# Patient Record
Sex: MALE | Race: WHITE | NOT HISPANIC OR LATINO | URBAN - NONMETROPOLITAN AREA
[De-identification: names, ages, dates, MRNs, and addresses within clinical notes are randomized per-mention and may not be internally consistent; named-entity substitution may affect disease eponyms.]

---

## 2019-02-05 ENCOUNTER — OFFICE VISIT (OUTPATIENT)
Dept: CARDIOLOGY | Facility: CLINIC | Age: 64
End: 2019-02-05

## 2019-02-05 VITALS
BODY MASS INDEX: 24.56 KG/M2 | HEART RATE: 94 BPM | HEIGHT: 74 IN | WEIGHT: 191.4 LBS | OXYGEN SATURATION: 95 % | DIASTOLIC BLOOD PRESSURE: 73 MMHG | SYSTOLIC BLOOD PRESSURE: 104 MMHG

## 2019-02-05 DIAGNOSIS — R07.2 PRECORDIAL PAIN: Primary | ICD-10-CM

## 2019-02-05 DIAGNOSIS — E78.2 MIXED HYPERLIPIDEMIA: ICD-10-CM

## 2019-02-05 DIAGNOSIS — R55 SYNCOPE AND COLLAPSE: ICD-10-CM

## 2019-02-05 DIAGNOSIS — I63.9 CEREBROVASCULAR ACCIDENT (CVA), UNSPECIFIED MECHANISM (HCC): ICD-10-CM

## 2019-02-05 DIAGNOSIS — R00.2 PALPITATION: ICD-10-CM

## 2019-02-05 DIAGNOSIS — R06.02 SHORTNESS OF BREATH: ICD-10-CM

## 2019-02-05 PROBLEM — E11.8 TYPE 2 DIABETES MELLITUS WITH COMPLICATION, WITH LONG-TERM CURRENT USE OF INSULIN (HCC): Status: ACTIVE | Noted: 2019-02-05

## 2019-02-05 PROBLEM — Z79.4 TYPE 2 DIABETES MELLITUS WITH COMPLICATION, WITH LONG-TERM CURRENT USE OF INSULIN (HCC): Status: ACTIVE | Noted: 2019-02-05

## 2019-02-05 PROBLEM — D50.9 IRON DEFICIENCY ANEMIA: Status: ACTIVE | Noted: 2019-02-05

## 2019-02-05 PROBLEM — N28.9 KIDNEY DISEASE: Status: ACTIVE | Noted: 2019-02-05

## 2019-02-05 PROCEDURE — 99204 OFFICE O/P NEW MOD 45 MIN: CPT | Performed by: INTERNAL MEDICINE

## 2019-02-05 PROCEDURE — 93000 ELECTROCARDIOGRAM COMPLETE: CPT | Performed by: INTERNAL MEDICINE

## 2019-02-05 RX ORDER — POLYETHYLENE GLYCOL 3350 17 G/17G
17 POWDER, FOR SOLUTION ORAL DAILY PRN
COMMUNITY

## 2019-02-05 RX ORDER — DULOXETIN HYDROCHLORIDE 60 MG/1
60 CAPSULE, DELAYED RELEASE ORAL DAILY
COMMUNITY

## 2019-02-05 RX ORDER — NITROGLYCERIN 0.4 MG/1
0.4 TABLET SUBLINGUAL
COMMUNITY

## 2019-02-05 RX ORDER — DIMENHYDRINATE 50 MG
TABLET ORAL NIGHTLY
COMMUNITY

## 2019-02-05 RX ORDER — BACLOFEN 10 MG/1
5 TABLET ORAL 2 TIMES DAILY
COMMUNITY

## 2019-02-05 RX ORDER — ONDANSETRON 4 MG/1
4 TABLET, FILM COATED ORAL DAILY PRN
COMMUNITY

## 2019-02-05 RX ORDER — ACETAMINOPHEN 325 MG/1
650 TABLET ORAL EVERY 6 HOURS PRN
COMMUNITY

## 2019-02-05 RX ORDER — GABAPENTIN 100 MG/1
100 CAPSULE ORAL 2 TIMES DAILY
COMMUNITY

## 2019-02-05 RX ORDER — TIZANIDINE HYDROCHLORIDE 2 MG/1
2 CAPSULE, GELATIN COATED ORAL 3 TIMES DAILY PRN
COMMUNITY

## 2019-02-05 RX ORDER — ATORVASTATIN CALCIUM 40 MG/1
40 TABLET, FILM COATED ORAL NIGHTLY
Qty: 30 TABLET | Refills: 11 | Status: SHIPPED | OUTPATIENT
Start: 2019-02-05

## 2019-02-05 RX ORDER — GLIPIZIDE 2.5 MG/1
2.5 TABLET, EXTENDED RELEASE ORAL DAILY
COMMUNITY

## 2019-02-05 RX ORDER — IRON POLYSACCHARIDE COMPLEX 150 MG
150 CAPSULE ORAL DAILY
COMMUNITY

## 2019-02-05 RX ORDER — SIMVASTATIN 20 MG
20 TABLET ORAL NIGHTLY
COMMUNITY
End: 2019-02-05 | Stop reason: ALTCHOICE

## 2019-02-05 RX ORDER — CETIRIZINE HYDROCHLORIDE 10 MG/1
10 TABLET ORAL DAILY
COMMUNITY

## 2019-02-05 RX ORDER — CLOPIDOGREL BISULFATE 75 MG/1
75 TABLET ORAL DAILY
Qty: 30 TABLET | Refills: 11 | Status: SHIPPED | OUTPATIENT
Start: 2019-02-05

## 2019-02-05 RX ORDER — ALBUTEROL SULFATE 90 UG/1
2 AEROSOL, METERED RESPIRATORY (INHALATION) EVERY 6 HOURS PRN
COMMUNITY

## 2019-02-05 RX ORDER — LEVOCETIRIZINE DIHYDROCHLORIDE 5 MG/1
5 TABLET, FILM COATED ORAL EVERY EVENING
COMMUNITY

## 2019-02-05 NOTE — PROGRESS NOTES
Subjective   Saturnino Dunaway is a 63 y.o. male     Chief Complaint   Patient presents with   • Establish Care   • Chest Pain   • Shortness of Breath   • Palpitations   • Hyperlipidemia       PROBLEM LIST:     1. Chest Pain  2. Shortness of Breath  3. Palpitations  4. CVA  5. DM, type 2, insulin dependent  6. Hyperlipidemia  7. Chronic kidney disease  8. Anemia  9. Orthostatic Hypotension            Specialty Problems        Cardiology Problems    Mixed hyperlipidemia        Palpitation                HPI:  Mr. Saturnino Dunaway is a 63-year-old white male seen today for evaluation of chest pain.    Mr. Dunaway describes the onset of chest pain approximately one month ago.  Rest noted discomfort when he was lying in bed.  He had a sudden lancinating short-lived left mid parasternal pain.  Lost him to have a sense of shortness of breath and he had to sit up in bed.  And was lasted only seconds and resolved completely.  However, since that time, the patient has had multiple similar episodes.  These frequently occur when lying in bed at night.  However, the patient also describes exertional chest discomfort.  He was walking up and down the [place] using his walker when he developed similar chest discomfort.  This was associated with profound shortness of breath but not palpitations, nausea, or diaphoresis.  Symptoms resolved when walking stopped.  The patient took a nap, awoke, but was still short of breath and having chest discomfort.  He lied back down and went to sleep, and when he awoke a second time he no longer had chest discomfort.    The patient describes episodes of orthopnea, PND, and lower extremity edema.  Each of these have been relatively stable over time.  He denies symptoms of prolonged palpitations and he had one episode of syncope approximately one year ago.  He was evaluated at that time with no obvious etiology.  Mr. Dunaway does describe headache lightheadedness.  The patient admits to having a stroke  approximately one year ago which caused imbalance and difficulty with the lower extremities per his report.  He does not claudicate.  He describes symptoms compatible with significant diabetic neuropathy.                  CURRENT MEDICATION:    Current Outpatient Medications   Medication Sig Dispense Refill   • acetaminophen (TYLENOL) 325 MG tablet Take 650 mg by mouth Every 6 (Six) Hours As Needed for Mild Pain .     • albuterol sulfate  (90 Base) MCG/ACT inhaler Inhale 2 puffs Every 6 (Six) Hours As Needed for Wheezing.     • aspirin 81 MG tablet Take 81 mg by mouth Daily.     • baclofen (LIORESAL) 10 MG tablet Take 5 mg by mouth 2 (Two) Times a Day.     • cetirizine (zyrTEC) 10 MG tablet Take 10 mg by mouth Daily.     • Cholecalciferol (VITAMIN D3 PO) Take 2,000 Units by mouth Daily.     • Cyanocobalamin (VITAMIN B 12 PO) Take 1,000 mcg by mouth Daily.     • diclofenac (VOLTAREN) 1 % gel gel Apply 4 g topically to the appropriate area as directed 4 (Four) Times a Day As Needed.     • DULoxetine (CYMBALTA) 60 MG capsule Take 60 mg by mouth Daily.     • Flaxseed, Linseed, (FLAX SEED OIL) 1000 MG capsule Take  by mouth Every Night.     • fluticasone (VERAMYST) 27.5 MCG/SPRAY nasal spray 1 spray into the nostril(s) as directed by provider 2 (Two) Times a Day As Needed for Rhinitis.     • gabapentin (NEURONTIN) 100 MG capsule Take 100 mg by mouth 2 (Two) Times a Day.     • glipiZIDE (GLUCOTROL XL) 2.5 MG 24 hr tablet Take 2.5 mg by mouth Daily.     • insulin detemir (LEVEMIR) 100 UNIT/ML injection Inject 47 Units under the skin into the appropriate area as directed Every Night.     • insulin NPH-insulin regular (NOVOLIN 70/30) (70-30) 100 UNIT/ML injection Inject 6 Units under the skin into the appropriate area as directed 2 (Two) Times a Day With Meals.     • iron polysaccharides (NIFEREX) 150 MG capsule Take 150 mg by mouth Daily.     • levocetirizine (XYZAL) 5 MG tablet Take 5 mg by mouth Every Evening.    "  • linaclotide (LINZESS) 145 MCG capsule capsule Take 145 mcg by mouth Every Night.     • MOMETASONE FUROATE NA 2 sprays into the nostril(s) as directed by provider Daily.     • Multiple Vitamins-Minerals (CERTAVITE/ANTIOXIDANTS PO) Take  by mouth Daily.     • ondansetron (ZOFRAN) 4 MG tablet Take 4 mg by mouth Daily As Needed for Nausea or Vomiting.     • polyethylene glycol (MIRALAX) packet Take 17 g by mouth Daily As Needed.     • simvastatin (ZOCOR) 20 MG tablet Take 20 mg by mouth Every Night.     • TiZANidine (ZANAFLEX) 2 MG capsule Take 2 mg by mouth 3 (Three) Times a Day As Needed for Muscle Spasms.     • nitroglycerin (NITROSTAT) 0.4 MG SL tablet Place 0.4 mg under the tongue Every 5 (Five) Minutes As Needed for Chest Pain. Take no more than 3 doses in 15 minutes.       No current facility-administered medications for this visit.        ALLERGIES:    Patient has no known allergies.    PAST MEDICAL HISTORY:    Past Medical History:   Diagnosis Date   • Anemia    • Chest pain    • Chronic kidney disease    • Diabetes mellitus (CMS/HCC)    • Hyperlipidemia    • Palpitation    • Shortness of breath        SURGICAL HISTORY:    Past Surgical History:   Procedure Laterality Date   • SKIN SURGERY      \"knots removed from shoulder\"       SOCIAL HISTORY:    Social History     Socioeconomic History   • Marital status:      Spouse name: Not on file   • Number of children: Not on file   • Years of education: Not on file   • Highest education level: Not on file   Social Needs   • Financial resource strain: Not on file   • Food insecurity - worry: Not on file   • Food insecurity - inability: Not on file   • Transportation needs - medical: Not on file   • Transportation needs - non-medical: Not on file   Occupational History   • Not on file   Tobacco Use   • Smoking status: Former Smoker     Types: Cigarettes   • Smokeless tobacco: Never Used   • Tobacco comment: used to smoke 1 PPD  for approx. 20+ yrs. " "  Substance and Sexual Activity   • Alcohol use: No     Frequency: Never   • Drug use: No   • Sexual activity: Not on file   Other Topics Concern   • Not on file   Social History Narrative   • Not on file       FAMILY HISTORY:    No family history on file.    Review of Systems   HENT: Positive for hearing loss.    Eyes: Positive for visual disturbance (wears glasses).   Respiratory: Positive for shortness of breath.    Cardiovascular: Positive for chest pain (sharp pain to left breast area, gets shortness of breath.) and palpitations.   Musculoskeletal: Positive for gait problem (ambulates with rolling walker).   Skin: Negative.        VISIT VITALS:  Vitals:    02/05/19 1320   BP: 104/73   BP Location: Right arm   Patient Position: Sitting   Pulse: 94   SpO2: 95%   Weight: 86.8 kg (191 lb 6.4 oz)   Height: 188 cm (74\")      /73 (BP Location: Right arm, Patient Position: Sitting)   Pulse 94   Ht 188 cm (74\")   Wt 86.8 kg (191 lb 6.4 oz)   SpO2 95%   BMI 24.57 kg/m²     RECENT LABS:    Objective       Physical Exam   Constitutional: He is oriented to person, place, and time. He appears well-developed and well-nourished. No distress.   HENT:   Head: Normocephalic and atraumatic.   No oral lesions   Eyes: Conjunctivae and EOM are normal. Pupils are equal, round, and reactive to light.   No ptosis or lid lag  CARY, pupils mildly dilated  Extra ocular eye motions intact     Neck: Normal range of motion. Neck supple. No hepatojugular reflux and no JVD present. Carotid bruit is not present. No tracheal deviation present.   NL. Carotid upstrokes     Cardiovascular: Normal rate, regular rhythm, S1 normal, S2 normal, normal heart sounds and intact distal pulses. Exam reveals no S3, no S4 and no friction rub.   No murmur heard.  Pulses:       Radial pulses are 2+ on the right side, and 2+ on the left side.   Pulmonary/Chest: Effort normal and breath sounds normal. He has no wheezes. He has no rhonchi. He has no " rales.   NL. Expir. phase   Abdominal: Soft. Bowel sounds are normal. He exhibits no distension, no abdominal bruit and no mass. There is no tenderness. There is no rebound and no guarding.   Musculoskeletal: Normal range of motion. He exhibits edema. He exhibits no tenderness or deformity.   LLE, trace-1+ edema, palpable pedal pulses  RLE, trace edema above the sock line, palpable pedal pulses     Neurological: He is alert and oriented to person, place, and time.   Skin: Skin is warm and dry. No rash noted. No erythema. No pallor.   Psychiatric: He has a normal mood and affect. His behavior is normal. Judgment and thought content normal.   Nursing note and vitals reviewed.        ECG 12 Lead  Date/Time: 2/5/2019 1:48 PM  Performed by: Marck Sullivan MD  Authorized by: Marck Sullivan MD   Comments: Normal sinus rhythm.  Within normal limits.              Assessment/Plan   #1.  Chest pain very atypical for angina but with some features compatible with ischemia.  What is particularly concerning is the chest discomfort that occurred while ambulating which appeared to recur several times thereafter and which was accompanied by severe shortness of breath.  Therefore, risk stratification from the standpoint of myocardial ischemia needs to be pursued.    #2.  In that regard we will perform pharmacologic stress testing as the patient is completely unable to walk on the treadmill.    #3.  I would also like to get an echocardiogram to assess LV systolic and diastolic performance, LV filling pressures, pulmonary pressures, and to look for non-ischemic causes of chest discomfort    #4.  We will perform an event monitoring feels some of the patient's symptoms may be related to sensed rhythm abnormalities.  Additionally, as far as I can tell, the patient had a cryptogenic stroke.  Therefore, paroxysmal atrial fibrillation needs to be excluded as a cause.    #5.  Empirically, we will add Plavix to the patient's aspirin,  change simvastatin to atorvastatin 40 mg daily, and check fasting lipid profile liver enzymes in 6-8 weeks.    #6.  With diabetes, Mr. Segovia should be considered for ACE inhibitor or angiotensin receptor blocker therapy.  However, with a history of renal insufficiency, we will hold that therapy pending review of labs.  We will see the patient in follow-up after testing or on a when necessary basis for symptoms as discussed.   Diagnosis Plan   1. Precordial pain  ECG 12 Lead   2. Shortness of breath  ECG 12 Lead   3. Palpitation     4. Cerebrovascular accident (CVA), unspecified mechanism (CMS/HCC)     5. Mixed hyperlipidemia         No Follow-up on file.              Patient's Body mass index is 24.57 kg/m². BMI is within normal parameters. No follow-up required..       Cierra López LPN    Scribed for Dr. Marck Sullivan by Cierra López LPN February 5, 2019 1:49 PM         Electronically signed by:            This note is dictated utilizing voice recognition software.  Although this record has been proof read, transcriptional errors may still be present. If questions occur regarding the content of this record please do not hesitate to call our office.

## 2019-03-26 ENCOUNTER — TELEPHONE (OUTPATIENT)
Dept: CARDIOLOGY | Facility: CLINIC | Age: 64
End: 2019-03-26

## 2019-03-26 DIAGNOSIS — E78.2 MIXED HYPERLIPIDEMIA: ICD-10-CM

## 2019-03-26 DIAGNOSIS — R00.2 PALPITATIONS: ICD-10-CM

## 2019-03-26 DIAGNOSIS — R06.02 SHORTNESS OF BREATH: ICD-10-CM

## 2019-03-26 DIAGNOSIS — R07.2 PRECORDIAL PAIN: Primary | ICD-10-CM

## 2019-03-26 NOTE — TELEPHONE ENCOUNTER
NEW ECHO ORDER ENTERED. FRANK,LPN        ----- Message from Laura Ruiz sent at 3/26/2019  4:28 PM EDT -----  Contact: [phone]      ----- Message -----  From: Mira Luna RegSched Rep  Sent: 3/26/2019   2:36 PM  To: Mercedes Harden, Laura Ruiz    I STILL NEED A NEW ECHO ON HIM PLEASE. THANK YOU.    ----- Message -----  From: Erika Lazo RegSched Rep  Sent: 3/5/2019  10:58 AM  To: Mercedes Deleon    Pt missed testing-please call to r/s